# Patient Record
Sex: FEMALE | Race: OTHER | HISPANIC OR LATINO
[De-identification: names, ages, dates, MRNs, and addresses within clinical notes are randomized per-mention and may not be internally consistent; named-entity substitution may affect disease eponyms.]

---

## 2018-03-26 ENCOUNTER — ASOB RESULT (OUTPATIENT)
Age: 48
End: 2018-03-26

## 2018-03-26 ENCOUNTER — APPOINTMENT (OUTPATIENT)
Dept: ANTEPARTUM | Facility: CLINIC | Age: 48
End: 2018-03-26
Payer: SELF-PAY

## 2018-03-26 PROBLEM — Z00.00 ENCOUNTER FOR PREVENTIVE HEALTH EXAMINATION: Status: ACTIVE | Noted: 2018-03-26

## 2018-03-26 PROCEDURE — 76830 TRANSVAGINAL US NON-OB: CPT

## 2018-03-26 PROCEDURE — 76857 US EXAM PELVIC LIMITED: CPT

## 2019-09-25 ENCOUNTER — RESULT REVIEW (OUTPATIENT)
Age: 49
End: 2019-09-25

## 2019-09-28 ENCOUNTER — EMERGENCY (EMERGENCY)
Facility: HOSPITAL | Age: 49
LOS: 1 days | Discharge: HOME | End: 2019-09-28
Attending: EMERGENCY MEDICINE
Payer: SELF-PAY

## 2019-09-28 VITALS
DIASTOLIC BLOOD PRESSURE: 84 MMHG | HEIGHT: 63 IN | HEART RATE: 70 BPM | WEIGHT: 149.91 LBS | OXYGEN SATURATION: 100 % | TEMPERATURE: 98 F | RESPIRATION RATE: 18 BRPM | SYSTOLIC BLOOD PRESSURE: 144 MMHG

## 2019-09-28 LAB
ALBUMIN SERPL ELPH-MCNC: 4.2 G/DL — SIGNIFICANT CHANGE UP (ref 3.3–5.2)
ALP SERPL-CCNC: 78 U/L — SIGNIFICANT CHANGE UP (ref 40–120)
ALT FLD-CCNC: 13 U/L — SIGNIFICANT CHANGE UP
ANION GAP SERPL CALC-SCNC: 11 MMOL/L — SIGNIFICANT CHANGE UP (ref 5–17)
ANISOCYTOSIS BLD QL: SIGNIFICANT CHANGE UP
AST SERPL-CCNC: 19 U/L — SIGNIFICANT CHANGE UP
BASOPHILS # BLD AUTO: 0.03 K/UL — SIGNIFICANT CHANGE UP (ref 0–0.2)
BASOPHILS NFR BLD AUTO: 0.2 % — SIGNIFICANT CHANGE UP (ref 0–2)
BILIRUB SERPL-MCNC: 0.6 MG/DL — SIGNIFICANT CHANGE UP (ref 0.4–2)
BUN SERPL-MCNC: 14 MG/DL — SIGNIFICANT CHANGE UP (ref 8–20)
CALCIUM SERPL-MCNC: 9.4 MG/DL — SIGNIFICANT CHANGE UP (ref 8.6–10.2)
CHLORIDE SERPL-SCNC: 99 MMOL/L — SIGNIFICANT CHANGE UP (ref 98–107)
CO2 SERPL-SCNC: 30 MMOL/L — HIGH (ref 22–29)
CREAT SERPL-MCNC: 0.54 MG/DL — SIGNIFICANT CHANGE UP (ref 0.5–1.3)
ELLIPTOCYTES BLD QL SMEAR: SLIGHT — SIGNIFICANT CHANGE UP
EOSINOPHIL # BLD AUTO: 0.06 K/UL — SIGNIFICANT CHANGE UP (ref 0–0.5)
EOSINOPHIL NFR BLD AUTO: 0.4 % — SIGNIFICANT CHANGE UP (ref 0–6)
GLUCOSE SERPL-MCNC: 101 MG/DL — SIGNIFICANT CHANGE UP (ref 70–115)
HCG SERPL-ACNC: <4 MIU/ML — SIGNIFICANT CHANGE UP
HCT VFR BLD CALC: 31.9 % — LOW (ref 34.5–45)
HGB BLD-MCNC: 8.8 G/DL — LOW (ref 11.5–15.5)
IMM GRANULOCYTES NFR BLD AUTO: 0.4 % — SIGNIFICANT CHANGE UP (ref 0–1.5)
LYMPHOCYTES # BLD AUTO: 14.9 % — SIGNIFICANT CHANGE UP (ref 13–44)
LYMPHOCYTES # BLD AUTO: 2.01 K/UL — SIGNIFICANT CHANGE UP (ref 1–3.3)
MANUAL SMEAR VERIFICATION: SIGNIFICANT CHANGE UP
MCHC RBC-ENTMCNC: 18.3 PG — LOW (ref 27–34)
MCHC RBC-ENTMCNC: 27.6 GM/DL — LOW (ref 32–36)
MCV RBC AUTO: 66.2 FL — LOW (ref 80–100)
MICROCYTES BLD QL: SIGNIFICANT CHANGE UP
MONOCYTES # BLD AUTO: 0.85 K/UL — SIGNIFICANT CHANGE UP (ref 0–0.9)
MONOCYTES NFR BLD AUTO: 6.3 % — SIGNIFICANT CHANGE UP (ref 2–14)
NEUTROPHILS # BLD AUTO: 10.46 K/UL — HIGH (ref 1.8–7.4)
NEUTROPHILS NFR BLD AUTO: 77.8 % — HIGH (ref 43–77)
OVALOCYTES BLD QL SMEAR: SLIGHT — SIGNIFICANT CHANGE UP
PLAT MORPH BLD: NORMAL — SIGNIFICANT CHANGE UP
PLATELET # BLD AUTO: 457 K/UL — HIGH (ref 150–400)
POIKILOCYTOSIS BLD QL AUTO: SLIGHT — SIGNIFICANT CHANGE UP
POLYCHROMASIA BLD QL SMEAR: SLIGHT — SIGNIFICANT CHANGE UP
POTASSIUM SERPL-MCNC: 4.5 MMOL/L — SIGNIFICANT CHANGE UP (ref 3.5–5.3)
POTASSIUM SERPL-SCNC: 4.5 MMOL/L — SIGNIFICANT CHANGE UP (ref 3.5–5.3)
PROT SERPL-MCNC: 7.6 G/DL — SIGNIFICANT CHANGE UP (ref 6.6–8.7)
RBC # BLD: 4.82 M/UL — SIGNIFICANT CHANGE UP (ref 3.8–5.2)
RBC # FLD: 18.5 % — HIGH (ref 10.3–14.5)
RBC BLD AUTO: ABNORMAL
SODIUM SERPL-SCNC: 140 MMOL/L — SIGNIFICANT CHANGE UP (ref 135–145)
WBC # BLD: 13.47 K/UL — HIGH (ref 3.8–10.5)
WBC # FLD AUTO: 13.47 K/UL — HIGH (ref 3.8–10.5)

## 2019-09-28 PROCEDURE — 84702 CHORIONIC GONADOTROPIN TEST: CPT

## 2019-09-28 PROCEDURE — T1013: CPT

## 2019-09-28 PROCEDURE — 80053 COMPREHEN METABOLIC PANEL: CPT

## 2019-09-28 PROCEDURE — 96375 TX/PRO/DX INJ NEW DRUG ADDON: CPT

## 2019-09-28 PROCEDURE — 96374 THER/PROPH/DIAG INJ IV PUSH: CPT

## 2019-09-28 PROCEDURE — 99284 EMERGENCY DEPT VISIT MOD MDM: CPT | Mod: 25

## 2019-09-28 PROCEDURE — 85027 COMPLETE CBC AUTOMATED: CPT

## 2019-09-28 PROCEDURE — 99284 EMERGENCY DEPT VISIT MOD MDM: CPT

## 2019-09-28 PROCEDURE — 36415 COLL VENOUS BLD VENIPUNCTURE: CPT

## 2019-09-28 RX ORDER — METOCLOPRAMIDE HCL 10 MG
1 TABLET ORAL
Qty: 16 | Refills: 0
Start: 2019-09-28 | End: 2019-10-01

## 2019-09-28 RX ORDER — SODIUM CHLORIDE 9 MG/ML
1000 INJECTION INTRAMUSCULAR; INTRAVENOUS; SUBCUTANEOUS ONCE
Refills: 0 | Status: COMPLETED | OUTPATIENT
Start: 2019-09-28 | End: 2019-09-28

## 2019-09-28 RX ORDER — KETOROLAC TROMETHAMINE 30 MG/ML
15 SYRINGE (ML) INJECTION ONCE
Refills: 0 | Status: DISCONTINUED | OUTPATIENT
Start: 2019-09-28 | End: 2019-09-28

## 2019-09-28 RX ORDER — METOCLOPRAMIDE HCL 10 MG
10 TABLET ORAL ONCE
Refills: 0 | Status: COMPLETED | OUTPATIENT
Start: 2019-09-28 | End: 2019-09-28

## 2019-09-28 RX ADMIN — Medication 10 MILLIGRAM(S): at 17:39

## 2019-09-28 RX ADMIN — Medication 15 MILLIGRAM(S): at 17:39

## 2019-09-28 RX ADMIN — SODIUM CHLORIDE 1000 MILLILITER(S): 9 INJECTION INTRAMUSCULAR; INTRAVENOUS; SUBCUTANEOUS at 17:40

## 2019-09-28 NOTE — ED STATDOCS - PATIENT PORTAL LINK FT
You can access the FollowMyHealth Patient Portal offered by [facility] by registering at the following website: [email or web address]. By joining [employer]’s FollowMyHealth portal, you will also be able to view your health information using other applications (apps) compatible with our system.

## 2019-09-28 NOTE — ED STATDOCS - ATTENDING CONTRIBUTION TO CARE
Shanae: I performed a face to face bedside interview with patient regarding history of present illness, review of symptoms and past medical history. I completed an independent physical exam and ordered tests/medications as needed.  I have discussed patient's plan of care with advanced care provider. The advanced care provider assisted in  executing the discussed plan. I was available for any questions or issues that may have arose during the execution of the plan of care.

## 2019-09-28 NOTE — ED ADULT NURSE NOTE - OBJECTIVE STATEMENT
Patient A&Ox4 complaining of headache that began yesterday, has been taking Tylenol. Also complaining of blurry vision. Stated head feels "heavy."

## 2019-09-28 NOTE — ED STATDOCS - PHYSICAL EXAMINATION
Gen: No acute distress, non toxic, vomit bag with small emesis, well appearing  HEENT: Mucous membranes moist, pink conjunctivae, EOMI. NCAT no photophobia  Neck: supple  CV: RRR, nl s1/s2.  Resp: CTAB, normal rate and effort  GI: Abdomen soft, NT, ND. No rebound, no guarding  : No CVAT  Neuro: A&O x 3, moving all 4 extremities. nl cn 2-12, nl strength/sensation  MSK: No spine or joint tenderness to palpation  Skin: No rashes. intact and perfused.

## 2019-09-28 NOTE — ED STATDOCS - CLINICAL SUMMARY MEDICAL DECISION MAKING FREE TEXT BOX
suspect migraine headache, no red flags, gradual onset, no fever, neuro intact, vision wnl. while treat symptomatically, reassess.

## 2019-09-28 NOTE — ED STATDOCS - PROGRESS NOTE DETAILS
history and physical performed by intake physician - results reviewed and case discussed with attending  patient feeling better, tolerating PO, headache resolved

## 2019-09-28 NOTE — ED STATDOCS - OBJECTIVE STATEMENT
Yeni ED  48 y/o female n opmh c/o 2 days of headache, with some nausea/vomiting and blurred vision that resolved. gradual onset headache, similar to migraines she has had in past. No other neuro symptoms, no trauma, no f/c, no photophobia, no cp, sob or abd pain. Took tylenol 4 hours ago with mild relief.    ROS: No fever/chills. No eye pain/changes in vision, No ear pain/sore throat/dysphagia, No chest pain/palpitations. No SOB/cough/. No abdominal pain, , no black/bloody bm. No dysuria/frequency/discharge,    No rashes or breaks in skin. No numbness/tingling/weakness.

## 2020-05-28 NOTE — ED ADULT NURSE NOTE - AS SC BRADEN MOBILITY
(4) no limitation Render In Strict Bullet Format?: No Initiate Treatment: Tretinoin 0.025% apply to the face qhs\\nNicaprin take 1 capsule daily Detail Level: Zone Continue Regimen: Birthcontrol Discontinue Regimen: Epiduo Samples Given: Neutrogena rapid clear spot treat Initiate Treatment: Hydroquinone 4% cream apply nightly to the face qhs

## 2021-05-17 ENCOUNTER — APPOINTMENT (OUTPATIENT)
Dept: DISASTER EMERGENCY | Facility: OTHER | Age: 51
End: 2021-05-17
Payer: COMMERCIAL

## 2021-05-17 PROCEDURE — 0012A: CPT

## 2023-03-23 ENCOUNTER — APPOINTMENT (OUTPATIENT)
Dept: ULTRASOUND IMAGING | Facility: CLINIC | Age: 53
End: 2023-03-23

## 2023-09-12 ENCOUNTER — EMERGENCY (EMERGENCY)
Facility: HOSPITAL | Age: 53
LOS: 1 days | Discharge: HOME | End: 2023-09-12
Attending: EMERGENCY MEDICINE
Payer: COMMERCIAL

## 2023-09-12 VITALS
HEIGHT: 62 IN | HEART RATE: 63 BPM | TEMPERATURE: 99 F | OXYGEN SATURATION: 98 % | WEIGHT: 179.24 LBS | DIASTOLIC BLOOD PRESSURE: 89 MMHG | RESPIRATION RATE: 16 BRPM | SYSTOLIC BLOOD PRESSURE: 147 MMHG

## 2023-09-12 VITALS
TEMPERATURE: 98 F | SYSTOLIC BLOOD PRESSURE: 150 MMHG | RESPIRATION RATE: 14 BRPM | OXYGEN SATURATION: 98 % | HEART RATE: 61 BPM | DIASTOLIC BLOOD PRESSURE: 94 MMHG

## 2023-09-12 LAB
ALBUMIN SERPL ELPH-MCNC: 4.6 G/DL — SIGNIFICANT CHANGE UP (ref 3.3–5.2)
ALP SERPL-CCNC: 126 U/L — HIGH (ref 40–120)
ALT FLD-CCNC: 43 U/L — HIGH
ANION GAP SERPL CALC-SCNC: 15 MMOL/L — SIGNIFICANT CHANGE UP (ref 5–17)
AST SERPL-CCNC: 43 U/L — HIGH
BILIRUB SERPL-MCNC: 1.1 MG/DL — SIGNIFICANT CHANGE UP (ref 0.4–2)
BUN SERPL-MCNC: 23.3 MG/DL — HIGH (ref 8–20)
CALCIUM SERPL-MCNC: 9.6 MG/DL — SIGNIFICANT CHANGE UP (ref 8.4–10.5)
CHLORIDE SERPL-SCNC: 100 MMOL/L — SIGNIFICANT CHANGE UP (ref 96–108)
CO2 SERPL-SCNC: 23 MMOL/L — SIGNIFICANT CHANGE UP (ref 22–29)
CREAT SERPL-MCNC: 0.45 MG/DL — LOW (ref 0.5–1.3)
EGFR: 116 ML/MIN/1.73M2 — SIGNIFICANT CHANGE UP
GLUCOSE SERPL-MCNC: 100 MG/DL — HIGH (ref 70–99)
HCT VFR BLD CALC: 42.1 % — SIGNIFICANT CHANGE UP (ref 34.5–45)
HGB BLD-MCNC: 14.2 G/DL — SIGNIFICANT CHANGE UP (ref 11.5–15.5)
MCHC RBC-ENTMCNC: 29.1 PG — SIGNIFICANT CHANGE UP (ref 27–34)
MCHC RBC-ENTMCNC: 33.7 GM/DL — SIGNIFICANT CHANGE UP (ref 32–36)
MCV RBC AUTO: 86.3 FL — SIGNIFICANT CHANGE UP (ref 80–100)
PLATELET # BLD AUTO: 135 K/UL — LOW (ref 150–400)
POTASSIUM SERPL-MCNC: 4.3 MMOL/L — SIGNIFICANT CHANGE UP (ref 3.5–5.3)
POTASSIUM SERPL-SCNC: 4.3 MMOL/L — SIGNIFICANT CHANGE UP (ref 3.5–5.3)
PROT SERPL-MCNC: 7.5 G/DL — SIGNIFICANT CHANGE UP (ref 6.6–8.7)
RBC # BLD: 4.88 M/UL — SIGNIFICANT CHANGE UP (ref 3.8–5.2)
RBC # FLD: 13.8 % — SIGNIFICANT CHANGE UP (ref 10.3–14.5)
SODIUM SERPL-SCNC: 138 MMOL/L — SIGNIFICANT CHANGE UP (ref 135–145)
TROPONIN T SERPL-MCNC: <0.01 NG/ML — SIGNIFICANT CHANGE UP (ref 0–0.06)
WBC # BLD: 6.43 K/UL — SIGNIFICANT CHANGE UP (ref 3.8–10.5)
WBC # FLD AUTO: 6.43 K/UL — SIGNIFICANT CHANGE UP (ref 3.8–10.5)

## 2023-09-12 PROCEDURE — 93010 ELECTROCARDIOGRAM REPORT: CPT

## 2023-09-12 PROCEDURE — 93005 ELECTROCARDIOGRAM TRACING: CPT

## 2023-09-12 PROCEDURE — 71045 X-RAY EXAM CHEST 1 VIEW: CPT | Mod: 26

## 2023-09-12 PROCEDURE — 71045 X-RAY EXAM CHEST 1 VIEW: CPT

## 2023-09-12 PROCEDURE — 85027 COMPLETE CBC AUTOMATED: CPT

## 2023-09-12 PROCEDURE — T1013: CPT

## 2023-09-12 PROCEDURE — 36415 COLL VENOUS BLD VENIPUNCTURE: CPT

## 2023-09-12 PROCEDURE — 99285 EMERGENCY DEPT VISIT HI MDM: CPT

## 2023-09-12 PROCEDURE — 84484 ASSAY OF TROPONIN QUANT: CPT

## 2023-09-12 PROCEDURE — 99285 EMERGENCY DEPT VISIT HI MDM: CPT | Mod: 25

## 2023-09-12 PROCEDURE — 80053 COMPREHEN METABOLIC PANEL: CPT

## 2023-09-12 NOTE — ED ADULT NURSE NOTE - OBJECTIVE STATEMENT
Assumed pt care @2126 pt a&o x4 able to make needs known pt c/o intermittent 5/10 chest pain non rad x2wks, has nausea with pain. NSR with occasional pvc's on % on RA. Pt stated was sent for abnormal lab results, denies other complaints at this time

## 2023-09-12 NOTE — ED PROVIDER NOTE - EKG ADDITIONAL INFORMATION FREE TEXT
normal sinus rhythm with 1 PVC.  No ischemic S-T/Tw changes on my independent interpretation of the Emergency Department EKG.

## 2023-09-12 NOTE — ED PROVIDER NOTE - NS_HEARTSCHISTORY_ED_A_ED_NU
[Normal] : no nystagmus [] : Camden-Hallpike test is positive [de-identified] : 1-2 beats right torsional nystagmus [de-identified] : gait steady  Slightly Suspicious

## 2023-09-12 NOTE — ED PROVIDER NOTE - PROGRESS NOTE DETAILS
Corry BOWIE: Troponin negative, potassium within normal limits.  Reviewed results with patient.  Encouraged to follow-up with primary doctor and cardiologist.  Cardiology referral given.  Return precautions given.  Patient comfortable plan for discharge.  Is currently chest pain free.

## 2023-09-12 NOTE — ED PROVIDER NOTE - OBJECTIVE STATEMENT
insert  Efrem  52-year-old female history of hyperlipidemia presenting with intermittent nonexertional midsternal chest pain x2 weeks associated with mild nausea, no aggravating or alleviating factors.  Saw PMD on Thursday and had labs drawn, called today and told to come to the ER for elevated potassium of 7.  Denies shortness of breath, recent travel, leg pain, leg swelling, fever, cough.  Saw cardiologist a couple of years ago and had a stress test, did not know the results because she never followed up. Translation provided by ED :  Efrem  52-year-old female history of hyperlipidemia presenting with intermittent nonexertional midsternal chest pain x2 weeks associated with mild nausea, no aggravating or alleviating factors.  Saw PMD on Thursday and had labs drawn, called today and told to come to the ER for elevated potassium of 7.  Denies shortness of breath, recent travel, leg pain, leg swelling, fever, cough.  Saw cardiologist a couple of years ago and had a stress test, did not know the results because she never followed up.

## 2023-09-12 NOTE — ED PROVIDER NOTE - CLINICAL SUMMARY MEDICAL DECISION MAKING FREE TEXT BOX
patient with atypical chest pain x2 weeks, EKG within normal limits.  Heart score 2.  Also with outpatient elevated  potassium.  Plan for labs, repeat electrolytes, troponin x1, chest x-ray and reassess.  If work-up within normal limits consider outpatient follow-up with cardiology.

## 2023-09-12 NOTE — ED PROVIDER NOTE - PATIENT PORTAL LINK FT
You can access the FollowMyHealth Patient Portal offered by [facility] by registering at the following website: [email or web address]. By joining [employer]’s FollowMyHealth portal, you will also be able to view your health information using other applications (apps) compatible with our system.

## 2023-09-12 NOTE — ED ADULT TRIAGE NOTE - CHIEF COMPLAINT QUOTE
left sided chest pain x2 weeks. had blood work done Thursday, called today for elevated potassium (7)

## 2023-09-12 NOTE — ED ADULT NURSE NOTE - NSICDXPASTMEDICALHX_GEN_ALL_CORE_FT
Informed of result, no further questions - Rachelle PAST MEDICAL HISTORY:  HLD (hyperlipidemia)

## 2023-09-12 NOTE — ED ADULT NURSE NOTE - NSFALLUNIVINTERV_ED_ALL_ED
Bed/Stretcher in lowest position, wheels locked, appropriate side rails in place/Call bell, personal items and telephone in reach/Instruct patient to call for assistance before getting out of bed/chair/stretcher/Non-slip footwear applied when patient is off stretcher/[place] to call system/Physically safe environment - no spills, clutter or unnecessary equipment/Purposeful proactive rounding/Room/bathroom lighting operational, light cord in reach

## 2023-09-12 NOTE — ED PROVIDER NOTE - NSFOLLOWUPCLINICS_GEN_ALL_ED_FT
[facility]  Cardiology  [address] [place] [address]  Phone: [phone]  Fax:

## 2023-09-12 NOTE — ED PROVIDER NOTE - ATTENDING CONTRIBUTION TO CARE
Corry: I performed a face to face bedside interview with patient regarding history of present illness, review of symptoms and past medical history. I completed an independent physical exam and ordered tests/medications as needed.  I have discussed patient's plan of care with the resident. The resident assisted in  executing the discussed plan. I was available for any questions or issues that may have arose during the execution of the plan of care.

## 2023-12-30 ENCOUNTER — EMERGENCY (EMERGENCY)
Facility: HOSPITAL | Age: 53
LOS: 1 days | Discharge: HOME | End: 2023-12-30
Attending: EMERGENCY MEDICINE
Payer: COMMERCIAL

## 2023-12-30 VITALS
WEIGHT: 195.33 LBS | SYSTOLIC BLOOD PRESSURE: 156 MMHG | HEART RATE: 68 BPM | DIASTOLIC BLOOD PRESSURE: 107 MMHG | RESPIRATION RATE: 18 BRPM | HEIGHT: 62 IN | OXYGEN SATURATION: 98 % | TEMPERATURE: 98 F

## 2023-12-30 PROBLEM — E78.5 HYPERLIPIDEMIA, UNSPECIFIED: Chronic | Status: ACTIVE | Noted: 2023-09-12

## 2023-12-30 PROCEDURE — 99283 EMERGENCY DEPT VISIT LOW MDM: CPT

## 2023-12-30 PROCEDURE — 99284 EMERGENCY DEPT VISIT MOD MDM: CPT

## 2023-12-30 RX ORDER — ONDANSETRON 8 MG/1
4 TABLET, FILM COATED ORAL ONCE
Refills: 0 | Status: COMPLETED | OUTPATIENT
Start: 2023-12-30 | End: 2023-12-30

## 2023-12-30 RX ORDER — ONDANSETRON 8 MG/1
1 TABLET, FILM COATED ORAL
Qty: 20 | Refills: 0
Start: 2023-12-30 | End: 2024-01-03

## 2023-12-30 RX ADMIN — Medication 2 TABLET(S): at 15:48

## 2023-12-30 RX ADMIN — ONDANSETRON 4 MILLIGRAM(S): 8 TABLET, FILM COATED ORAL at 15:48

## 2023-12-30 NOTE — ED PROVIDER NOTE - OBJECTIVE STATEMENT
Nataliia Talavera 53-year-old female past medical history of headache comes to the ED with 1 week history of right-sided headache.  Patient noted takes Excedrin for headaches presently without relief of symptoms patient denies fever or neck pain chest pain or abdominal pain. 53-year-old female past medical history of headache comes to the ED with 1 week history of right-sided headache.  Patient noted takes Excedrin for headaches presently without relief of symptoms patient denies fever or neck pain chest pain or abdominal pain.

## 2023-12-30 NOTE — ED PROVIDER NOTE - CARE PROVIDER_API CALL
Efrem Crowe  Neurology  [address] [place] [address], [facility] [address]  [place] [address]  Phone: [phone]  Fax: [phone]  Follow Up Time:    Efrem Crowe  Neurology  [address] [place] [address], [facility] [address]  [place] [address]  Phone: [phone]  Fax: [phone]  Follow Up Time:

## 2023-12-30 NOTE — ED PROVIDER NOTE - CARE PROVIDERS DIRECT ADDRESSES
[email or web address]

## 2023-12-30 NOTE — ED PROVIDER NOTE - PATIENT PORTAL LINK FT
You can access the FollowMyHealth Patient Portal offered by [facility] by registering at the following website: [email or web address]. By joining [employer]’s FollowMyHealth portal, you will also be able to view your health information using other applications (apps) compatible with our system. You can access the FollowMyHealth Patient Portal offered by [facility] by registering at the following website: [email or web address]. By joining [employer]’s FollowMyHealth portal, you will also be able to view your health information using other applications (apps) compatible with our system.

## 2023-12-30 NOTE — ED PROVIDER NOTE - NSFOLLOWUPINSTRUCTIONS_ED_ALL_ED_FT
fioricet 1 - 2 tablets by mouth every 6 hours  follow up with primary care doctor in 3 - 5 days for referral to neurology

## 2023-12-30 NOTE — ED PROVIDER NOTE - CLINICAL SUMMARY MEDICAL DECISION MAKING FREE TEXT BOX
headache likely migraine ; tx with fioricet and zofran; reassess; consider ct  and referral to neurology

## 2024-01-17 NOTE — ED PROVIDER NOTE - ENMT, MLM
Subjective:      Patient ID: Ascencion Alvarez is a 68 y.o. female    Thyroid Problem  Presents for follow-up visit. Patient reports no diarrhea. The symptoms have been stable.   Other  The current episode started more than 1 year ago. Associated symptoms include congestion. Pertinent negatives include no coughing, rash, sore throat, vomiting or weakness.     Here in follow up for hypothyroidism and vitamin d deficiency and blood work.  Off vitamin d scrip for about a month.  No missed doses of thyroid disorder -has had some popping of ears over the last few days.  No sore throat or cold symptoms  Does feel some nasal congestion   no fever or chills.      Review of Systems   Constitutional:  Negative for unexpected weight change.   HENT:  Positive for congestion and sinus pressure. Negative for sore throat and trouble swallowing.    Respiratory:  Negative for cough.    Gastrointestinal:  Negative for diarrhea and vomiting.   Skin:  Negative for rash.   Neurological:  Negative for dizziness and weakness.     Reviewed allergy, medical, social, surgical, family and med list changes and updated   Files-reviewed blood work -stable      Social History     Socioeconomic History    Marital status:    Occupational History    Occupation: loading and recieving     Employer: [employer]   Tobacco Use    Smoking status: Every Day     Current packs/day: 0.20     Average packs/day: 0.2 packs/day for 10.0 years (2.0 ttl pk-yrs)     Types: Cigarettes    Smokeless tobacco: Never   Vaping Use    Vaping Use: Never used   Substance and Sexual Activity    Alcohol use: Yes     Comment: once weekly    Drug use: No     Social Determinants of Health     Financial Resource Strain: Low Risk  (4/11/2023)    Overall Financial Resource Strain (CARDIA)     Difficulty of Paying Living Expenses: Not hard at all   Transportation Needs: Unknown (4/11/2023)    PRAPARE - Transportation     Lack of Transportation (Non-Medical): No   Physical Activity:  Airway patent, Nasal mucosa clear. Mouth with normal mucosa. Throat has no vesicles, no oropharyngeal exudates and uvula is midline.

## 2024-01-23 ENCOUNTER — APPOINTMENT (OUTPATIENT)
Dept: OBGYN | Facility: CLINIC | Age: 54
End: 2024-01-23
Payer: COMMERCIAL

## 2024-01-23 VITALS
HEIGHT: 62 IN | BODY MASS INDEX: 35.66 KG/M2 | SYSTOLIC BLOOD PRESSURE: 120 MMHG | DIASTOLIC BLOOD PRESSURE: 82 MMHG | WEIGHT: 193.8 LBS

## 2024-01-23 DIAGNOSIS — Z87.19 PERSONAL HISTORY OF OTHER DISEASES OF THE DIGESTIVE SYSTEM: ICD-10-CM

## 2024-01-23 DIAGNOSIS — Z86.39 PERSONAL HISTORY OF OTHER ENDOCRINE, NUTRITIONAL AND METABOLIC DISEASE: ICD-10-CM

## 2024-01-23 DIAGNOSIS — Z83.3 FAMILY HISTORY OF DIABETES MELLITUS: ICD-10-CM

## 2024-01-23 DIAGNOSIS — Z12.4 ENCOUNTER FOR SCREENING FOR MALIGNANT NEOPLASM OF CERVIX: ICD-10-CM

## 2024-01-23 DIAGNOSIS — Z78.9 OTHER SPECIFIED HEALTH STATUS: ICD-10-CM

## 2024-01-23 DIAGNOSIS — R73.03 PREDIABETES.: ICD-10-CM

## 2024-01-23 PROCEDURE — 99203 OFFICE O/P NEW LOW 30 MIN: CPT | Mod: 25

## 2024-01-23 PROCEDURE — 99386 PREV VISIT NEW AGE 40-64: CPT

## 2024-01-23 RX ORDER — OMEPRAZOLE 20 MG/1
20 CAPSULE, DELAYED RELEASE ORAL
Refills: 0 | Status: ACTIVE | COMMUNITY

## 2024-01-23 RX ORDER — ATORVASTATIN CALCIUM 80 MG/1
TABLET, FILM COATED ORAL
Refills: 0 | Status: ACTIVE | COMMUNITY

## 2024-01-23 NOTE — COUNSELING
[Body Image] : body image [Nutrition/ Exercise/ Weight Management] : nutrition, exercise, weight management [Vitamins/Supplements] : vitamins/supplements [Breast Self Exam] : breast self exam [STD (testing, results, tx)] : STD (testing, results, tx) [Confidentiality] : confidentiality [Lab Results] : lab results [Medication Management] : medication management

## 2024-01-23 NOTE — PHYSICAL EXAM
[Appropriately responsive] : appropriately responsive [Alert] : alert [No Acute Distress] : no acute distress [Regular Rate Rhythm] : regular rate rhythm [Soft] : soft [Non-tender] : non-tender [Non-distended] : non-distended [No HSM] : No HSM [No Lesions] : no lesions [No Mass] : no mass [Oriented x3] : oriented x3 [Labia Majora] : normal [Labia Minora] : normal [Normal] : normal [Tenderness] : tender [Enlarged ___ wks] : enlarged [unfilled] ~Uweeks [Uterine Adnexae] : normal

## 2024-01-24 LAB
BASOPHILS # BLD AUTO: 0.04 K/UL
BASOPHILS NFR BLD AUTO: 0.7 %
C TRACH RRNA SPEC QL NAA+PROBE: NOT DETECTED
EOSINOPHIL # BLD AUTO: 0.19 K/UL
EOSINOPHIL NFR BLD AUTO: 3.2 %
FSH SERPL-MCNC: 36.9 IU/L
HCG SERPL-MCNC: 1 MIU/ML
HCT VFR BLD CALC: 46.8 %
HGB BLD-MCNC: 14.4 G/DL
HPV HIGH+LOW RISK DNA PNL CVX: NOT DETECTED
IMM GRANULOCYTES NFR BLD AUTO: 0.2 %
LH SERPL-ACNC: 35.6 IU/L
LYMPHOCYTES # BLD AUTO: 2.06 K/UL
LYMPHOCYTES NFR BLD AUTO: 34.2 %
MAN DIFF?: NORMAL
MCHC RBC-ENTMCNC: 27.9 PG
MCHC RBC-ENTMCNC: 30.8 GM/DL
MCV RBC AUTO: 90.5 FL
MONOCYTES # BLD AUTO: 0.5 K/UL
MONOCYTES NFR BLD AUTO: 8.3 %
N GONORRHOEA RRNA SPEC QL NAA+PROBE: NOT DETECTED
NEUTROPHILS # BLD AUTO: 3.22 K/UL
NEUTROPHILS NFR BLD AUTO: 53.4 %
PLATELET # BLD AUTO: 255 K/UL
PROLACTIN SERPL-MCNC: 7.2 NG/ML
RBC # BLD: 5.17 M/UL
RBC # FLD: 14.2 %
SOURCE TP AMPLIFICATION: NORMAL
TSH SERPL-ACNC: 2.72 UIU/ML
WBC # FLD AUTO: 6.02 K/UL

## 2024-01-24 NOTE — HISTORY OF PRESENT ILLNESS
[N] : Patient does not use contraception [Y] : Positive pregnancy history [Menarche Age: ____] : age at menarche was [unfilled] [FreeTextEntry1] : 52yo  presents to establish care. PMhx hyperlipidemia on meds, fibroids and preDM. Patient was getting her GYN care at [facility] but her insurance is no longer accepted there. She was referred due to an incidental finding on a MRI of her lower back. On the MRI done 23 it was noted a "large mass within the endometrium" seen. Patient reports history of uterine fibroids but states they were removed in 2019. Reports a D&C in 2019 due to AUB and biopsy was benign (report in chart). Patient is not sure if she is menopause. Last menses full menses she got was 2023. LMP- 2023 [PGxTotal] : 1 [[facility]] : 1 [PGHxAbortions] : 0 [PGHxPremature] : 0 [[facility]iving] : 1 [PGHxABInduced] : 0 [PGHxEctopic] : 0 [PGHxABSpont] : 0 [PGHxMultBirths] : 0

## 2024-01-24 NOTE — PLAN
[FreeTextEntry1] : PLAN  Pelvic sono and TVS referral given to assess pelvic mass and endometrial lining. Discussed she may need a endometrial biopsy with hysteroscopy with an MD. That will be determined after pelvic ultrasound.  PAP smear done with reflex HPV Mammogram report reviewed from 8/2023- BIRAD 2- normal New referral for this years mammogram given   RTO

## 2024-01-29 LAB — CYTOLOGY CVX/VAG DOC THIN PREP: NORMAL

## 2024-02-01 ENCOUNTER — OUTPATIENT (OUTPATIENT)
Dept: OUTPATIENT SERVICES | Facility: HOSPITAL | Age: 54
LOS: 1 days | End: 2024-02-01
Payer: COMMERCIAL

## 2024-02-01 ENCOUNTER — APPOINTMENT (OUTPATIENT)
Dept: ULTRASOUND IMAGING | Facility: CLINIC | Age: 54
End: 2024-02-01
Payer: COMMERCIAL

## 2024-02-01 DIAGNOSIS — N94.89 OTHER SPECIFIED CONDITIONS ASSOCIATED WITH FEMALE GENITAL ORGANS AND MENSTRUAL CYCLE: ICD-10-CM

## 2024-02-01 PROCEDURE — 76830 TRANSVAGINAL US NON-OB: CPT | Mod: 26

## 2024-02-01 PROCEDURE — 76856 US EXAM PELVIC COMPLETE: CPT

## 2024-02-01 PROCEDURE — 76830 TRANSVAGINAL US NON-OB: CPT

## 2024-02-01 PROCEDURE — 76856 US EXAM PELVIC COMPLETE: CPT | Mod: 26

## 2024-02-06 ENCOUNTER — APPOINTMENT (OUTPATIENT)
Dept: OBGYN | Facility: CLINIC | Age: 54
End: 2024-02-06
Payer: COMMERCIAL

## 2024-02-06 VITALS
HEIGHT: 62 IN | DIASTOLIC BLOOD PRESSURE: 76 MMHG | SYSTOLIC BLOOD PRESSURE: 110 MMHG | BODY MASS INDEX: 35.51 KG/M2 | WEIGHT: 193 LBS

## 2024-02-06 PROCEDURE — 99214 OFFICE O/P EST MOD 30 MIN: CPT

## 2024-02-06 NOTE — DISCUSSION/SUMMARY
[FreeTextEntry1] : 53-year-old G1, P1 presents with pelvic pain and irregular vaginal bleeding questionable postmenopausal bleeding.  MRI and pelvic sonogram shows a large fibroid uterus.  Return in 2 weeks for hysteroscopy endometrial sampling after which decisions will be made concerning surgical treatment.

## 2024-02-06 NOTE — HISTORY OF PRESENT ILLNESS
[FreeTextEntry1] : 53-year-old -0-0-1 presents with pelvic pain and postmenopausal bleeding.  MRI and pelvic sonogram showed uterine mass consistent with fibroid uterus.  Her last normal cycle was 2023 and she reports vaginal bleeding recently.

## 2024-02-21 ENCOUNTER — APPOINTMENT (OUTPATIENT)
Dept: OBGYN | Facility: CLINIC | Age: 54
End: 2024-02-21
Payer: COMMERCIAL

## 2024-02-21 VITALS
WEIGHT: 197 LBS | SYSTOLIC BLOOD PRESSURE: 122 MMHG | DIASTOLIC BLOOD PRESSURE: 70 MMHG | HEIGHT: 62 IN | BODY MASS INDEX: 36.25 KG/M2

## 2024-02-21 LAB
HCG UR QL: NEGATIVE
QUALITY CONTROL: YES

## 2024-02-21 PROCEDURE — 81025 URINE PREGNANCY TEST: CPT

## 2024-02-21 PROCEDURE — 58558Z: CUSTOM

## 2024-02-21 NOTE — PROCEDURE
[Hysteroscopy] : Hysteroscopy [Time out performed] : Pre-procedure time out performed.  Patient's name, date of birth and procedure confirmed. [Consent Obtained] : Consent obtained [Abnormal uterine bleeding] : abnormal uterine bleeding [Postmenopausal bleeding] : postmenopausal bleeding [Risks] : risks [Benefits] : benefits [Alternatives] : alternatives [Patient] : patient [Infection] : infection [Bleeding] : bleeding [Allergic Reaction] : allergic reaction [Lidocaine___ mL] : [unfilled] ~UmL of lidocaine [flexible] : Using aseptic technique a hysteroscopy was performed using a flexible hysteroscope [Sent to Pathology] : specimen was placed in buffered formalin and sent for pathology [Antibiotics given] : antibiotics not given [Hemostasis obtained] : hemostasis obtained [Tolerated Well] : Patient tolerated the procedure well [Aftercare instructions/regstrictions given and follow-up scheduled] : Aftercare instructions/restrictions given and follow-up scheduled [de-identified] : Under sterile condition and with paracervical block the cervix was dilated and endometrial sampling obtained and sent to pathology.  Endometrial cavity appeared atrophic with irregular cavity and some fibroids noted.  No ulcerations noted.  Patient tolerated the procedure well.

## 2024-02-22 ENCOUNTER — NON-APPOINTMENT (OUTPATIENT)
Age: 54
End: 2024-02-22

## 2024-02-29 LAB — CORE LAB BIOPSY: NORMAL

## 2024-03-25 ENCOUNTER — APPOINTMENT (OUTPATIENT)
Dept: OBGYN | Facility: CLINIC | Age: 54
End: 2024-03-25
Payer: COMMERCIAL

## 2024-03-25 VITALS
WEIGHT: 197 LBS | BODY MASS INDEX: 36.25 KG/M2 | SYSTOLIC BLOOD PRESSURE: 120 MMHG | HEIGHT: 62 IN | DIASTOLIC BLOOD PRESSURE: 70 MMHG

## 2024-03-25 DIAGNOSIS — N94.89 OTHER SPECIFIED CONDITIONS ASSOCIATED WITH FEMALE GENITAL ORGANS AND MENSTRUAL CYCLE: ICD-10-CM

## 2024-03-25 DIAGNOSIS — N95.0 POSTMENOPAUSAL BLEEDING: ICD-10-CM

## 2024-03-25 DIAGNOSIS — N92.6 IRREGULAR MENSTRUATION, UNSPECIFIED: ICD-10-CM

## 2024-03-25 PROCEDURE — 99213 OFFICE O/P EST LOW 20 MIN: CPT

## 2024-03-25 NOTE — HISTORY OF PRESENT ILLNESS
[FreeTextEntry1] : 53-year-old -0-0-1 status post endometrial sampling hysteroscopy for postmenopausal bleeding.  Pathology showed inactive endometrium with stromal breakdown.  Sonogram and MRI large fibroid uterus.  Treatment options discussed including hysterectomy or observation.  In light of postmenopausal bleeding and large fibroid uterus hysterectomy may be more prudent.

## 2024-03-25 NOTE — PLAN
[FreeTextEntry1] : 53-year-old -0-0-1 status post endometrial sampling hysteroscopy for postmenopausal bleeding and pathology is benign.  Patient desires HEATHER/BSO.  Procedure to be scheduled.  Physical and psychological effects of hysterectomy discussed.

## 2024-06-10 ENCOUNTER — APPOINTMENT (OUTPATIENT)
Dept: NUCLEAR MEDICINE | Facility: CLINIC | Age: 54
End: 2024-06-10
Payer: SELF-PAY

## 2024-06-10 ENCOUNTER — OUTPATIENT (OUTPATIENT)
Dept: OUTPATIENT SERVICES | Facility: HOSPITAL | Age: 54
LOS: 1 days | End: 2024-06-10

## 2024-06-10 DIAGNOSIS — K76.0 FATTY (CHANGE OF) LIVER, NOT ELSEWHERE CLASSIFIED: ICD-10-CM

## 2024-06-10 PROCEDURE — 78227 HEPATOBIL SYST IMAGE W/DRUG: CPT | Mod: 26
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
## 2024-09-30 NOTE — ED PROVIDER NOTE - PRINCIPAL DIAGNOSIS
[facility] Well Adult Note  Name: Kimberlyn Felix Today’s Date: 2024   MRN: [id number] Sex: Female   Age: 48 y.o. Ethnicity: Non- / Non    : 1976 Race: White (non-)      Kimberlyn Felix is here for a well adult exam.       Subjective   History:  Feeling well, no complaints  Dental: up-to-date  Eye: up-to-date  Colonoscopy: up-to-date  Pap: up-to-date-  health  Mammo: ordered      Review of Systems   All other systems reviewed and are negative.      Allergies   Allergen Reactions    Latex Itching    Penicillins Rash     + test at allergist    Per skin test     Prior to Visit Medications    Medication Sig Taking? Authorizing Provider   cetirizine (ZYRTEC) 10 MG tablet TAKE 1 TABLET BY MOUTH DAILY Yes Patricia Cote, APRN - CNP   Calcium Carb-Cholecalciferol 600-1000 MG-UNIT CAPS Take by mouth Yes Martha Butler MD   Riboflavin (B2 PO) Take by mouth  Martha Butler MD   JUNEL 1.5/30 1.5-30 MG-MCG TABS TAKE 1 TABLET BY MOUTH DAILY  ProviderMartha MD     Past Medical History:   Diagnosis Date    Anal skin tag 2018    Ileitis 2016    Iliotibial band syndrome of both sides 2014     Past Surgical History:   Procedure Laterality Date    LASIK      WISDOM TOOTH EXTRACTION       Family History   Problem Relation Age of Onset    Hypertension Father     Coronary Art Dis Maternal Grandmother 47     Social History     Tobacco Use    Smoking status: Never    Smokeless tobacco: Never   Substance Use Topics    Alcohol use: Yes     Alcohol/week: 0.0 standard drinks of alcohol     Comment: rare    Drug use: No           Objective   Vital Signs  /70   Pulse (!) 101   Temp 96.9 °F (36.1 °C)   Ht 1.651 m (5' 5\")   Wt 84.8 kg (187 lb)   SpO2 98%   BMI 31.12 kg/m²     Wt Readings from Last 3 Encounters:   24 84.8 kg (187 lb)   23 83 kg (183 lb)   23 83.9 kg (185 lb)       Physical Exam  Vitals reviewed.   Constitutional:       General:  Headache